# Patient Record
Sex: FEMALE | Race: WHITE | NOT HISPANIC OR LATINO | Employment: OTHER | URBAN - METROPOLITAN AREA
[De-identification: names, ages, dates, MRNs, and addresses within clinical notes are randomized per-mention and may not be internally consistent; named-entity substitution may affect disease eponyms.]

---

## 2017-01-17 ENCOUNTER — GENERIC CONVERSION - ENCOUNTER (OUTPATIENT)
Dept: OTHER | Facility: OTHER | Age: 57
End: 2017-01-17

## 2017-04-20 ENCOUNTER — ALLSCRIPTS OFFICE VISIT (OUTPATIENT)
Dept: OTHER | Facility: OTHER | Age: 57
End: 2017-04-20

## 2017-04-20 DIAGNOSIS — Z12.31 ENCOUNTER FOR SCREENING MAMMOGRAM FOR MALIGNANT NEOPLASM OF BREAST: ICD-10-CM

## 2017-04-20 DIAGNOSIS — Z12.11 ENCOUNTER FOR SCREENING FOR MALIGNANT NEOPLASM OF COLON: ICD-10-CM

## 2017-05-18 ENCOUNTER — GENERIC CONVERSION - ENCOUNTER (OUTPATIENT)
Dept: OTHER | Facility: OTHER | Age: 57
End: 2017-05-18

## 2017-07-05 ENCOUNTER — ALLSCRIPTS OFFICE VISIT (OUTPATIENT)
Dept: OTHER | Facility: OTHER | Age: 57
End: 2017-07-05

## 2017-07-18 ENCOUNTER — ALLSCRIPTS OFFICE VISIT (OUTPATIENT)
Dept: OTHER | Facility: OTHER | Age: 57
End: 2017-07-18

## 2017-07-25 ENCOUNTER — TRANSCRIBE ORDERS (OUTPATIENT)
Dept: ADMINISTRATIVE | Facility: HOSPITAL | Age: 57
End: 2017-07-25

## 2017-07-25 ENCOUNTER — ALLSCRIPTS OFFICE VISIT (OUTPATIENT)
Dept: OTHER | Facility: OTHER | Age: 57
End: 2017-07-25

## 2017-07-25 DIAGNOSIS — R05.9 COUGH: ICD-10-CM

## 2017-07-25 DIAGNOSIS — R05.9 COUGH: Primary | ICD-10-CM

## 2017-07-31 ENCOUNTER — GENERIC CONVERSION - ENCOUNTER (OUTPATIENT)
Dept: OTHER | Facility: OTHER | Age: 57
End: 2017-07-31

## 2017-07-31 ENCOUNTER — HOSPITAL ENCOUNTER (OUTPATIENT)
Dept: PULMONOLOGY | Facility: HOSPITAL | Age: 57
Discharge: HOME | End: 2017-07-31
Payer: COMMERCIAL

## 2017-07-31 DIAGNOSIS — R05.9 COUGH: ICD-10-CM

## 2017-07-31 PROCEDURE — 94060 EVALUATION OF WHEEZING: CPT

## 2017-07-31 PROCEDURE — 94760 N-INVAS EAR/PLS OXIMETRY 1: CPT

## 2017-07-31 RX ORDER — ALBUTEROL SULFATE 2.5 MG/3ML
2.5 SOLUTION RESPIRATORY (INHALATION) ONCE AS NEEDED
Status: COMPLETED | OUTPATIENT
Start: 2017-07-31 | End: 2017-07-31

## 2017-07-31 RX ORDER — ALBUTEROL SULFATE 2.5 MG/3ML
SOLUTION RESPIRATORY (INHALATION)
Status: DISCONTINUED
Start: 2017-07-31 | End: 2017-08-04 | Stop reason: HOSPADM

## 2017-07-31 RX ADMIN — ALBUTEROL SULFATE 2.5 MG: 2.5 SOLUTION RESPIRATORY (INHALATION) at 13:15

## 2017-08-01 ENCOUNTER — TRANSCRIBE ORDERS (OUTPATIENT)
Dept: RADIOLOGY | Facility: CLINIC | Age: 57
End: 2017-08-01

## 2017-08-01 ENCOUNTER — APPOINTMENT (OUTPATIENT)
Dept: RADIOLOGY | Facility: CLINIC | Age: 57
End: 2017-08-01
Payer: COMMERCIAL

## 2017-08-01 ENCOUNTER — ALLSCRIPTS OFFICE VISIT (OUTPATIENT)
Dept: OTHER | Facility: OTHER | Age: 57
End: 2017-08-01

## 2017-08-01 DIAGNOSIS — R05.9 COUGH: ICD-10-CM

## 2017-08-01 PROCEDURE — 71020 HB CHEST X-RAY 2VW FRONTAL&LATL: CPT

## 2017-08-02 ENCOUNTER — GENERIC CONVERSION - ENCOUNTER (OUTPATIENT)
Dept: OTHER | Facility: OTHER | Age: 57
End: 2017-08-02

## 2017-08-23 ENCOUNTER — ALLSCRIPTS OFFICE VISIT (OUTPATIENT)
Dept: OTHER | Facility: OTHER | Age: 57
End: 2017-08-23

## 2018-01-10 ENCOUNTER — GENERIC CONVERSION - ENCOUNTER (OUTPATIENT)
Dept: OTHER | Facility: OTHER | Age: 58
End: 2018-01-10

## 2018-01-10 ENCOUNTER — ALLSCRIPTS OFFICE VISIT (OUTPATIENT)
Dept: OTHER | Facility: OTHER | Age: 58
End: 2018-01-10

## 2018-01-11 NOTE — MISCELLANEOUS
Message   Recorded as Task   Date: 11/02/2016 10:45 AM, Created By: Dianelys Waters   Task Name: Follow Up   Assigned To: Sofi Shin   Regarding Patient: Lizette Unger, Status: In Progress   CommentMaire Draft - 02 Nov 2016 10:45 AM     TASK CREATED  Caller: Self; [phone] (Home)  pt getting bad ha's from venlaxifene also wants a generic patch instead of the minivalle please call her at [address] Nov 2016 11:00 AM     TASK IN PROGRESS   Ketty Mccullough - 11 Nov 2016 11:08 AM     TASK EDITED  Pt started effexor and minivelle at same time  Has had ha"s since  Stopped effexor 2 days ago and still has ha  I think its half life is longer than 2 days? What to do??  I also explained generic minivelle not as convenient a patch  Will delve into this problem once we figure out what ha is from  Thanks   Shaun Edwards - 86 Nov 2016 3:40 PM     TASK REPLIED TO: Previously Assigned To Ricky Connell  Continue Effexor  I agree with minivelle; she will tolerate generic  Restart minivelle 2 weeks   Ketty Mccullough - 89 Nov 2016 9:35 AM     TASK EDITED  lmtcb   Ketty Mccullough - 74 Nov 2016 9:36 AM     TASK EDITED   Ketty Mccullough - 78 Nov 2016 10:41 AM     TASK EDITED  Pt on minivelle but not effexor and still has awful h/a  Pt will start effexor again and stop minivelle for 2 weeks  Will cb in 2 weeks  If ha goes away - probably due to minivelle  Ketty Mccullough - 09 Nov 2016 10:41 AM     TASK COMPLETED   Stephanie Beltre - 05 Dec 2016 10:21 AM     TASK REACTIVATED  Pt called w same complaint of [place] w minivelle  Pt started minivelle last wednesday and by saturday had bad ha again  Pt would like cb to advise what she should do  Pt is @ [address] Dec 2016 10:31 AM     TASK IN PROGRESS   Ketty Mccullough - 05 Dec 2016 10:34 AM     TASK EDITED  Pt does not get ha on effexor but within a day of putting on minivelle, her headaches return  What to do? Thanks   Ricky Connell - 05 Dec 2016 8:01 PM     TASK REPLIED TO: Previously Assigned To Ricky Connell  Have pt cut patch in half to see if benefit over next week  Can reduce dose via RX if that works  BereniceJan - 06 Dec 2016 8:08 AM     TASK EDITED  called pt - per dr Carolyn Jeffery, suggested pt cut patch in half -pt to call office to inform if she has relief with half dose  pt verbalized understanding  Active Problems    1  Acquired hypothyroidism (244 9) (E03 9)   2  Chest pain (786 50) (R07 9)   3  Encounter for gynecological examination with abnormal finding (V72 31) (Z01 411)   4  Encounter for routine gynecological examination (V72 31) (Z01 419)   5  Encounter for screening mammogram for malignant neoplasm of breast (V76 12)   (Z12 31)   6  Hypercholesterolemia (272 0) (E78 00)   7  Hypothyroidism (244 9) (E03 9)   8  Menopause (627 2)   9  Multiple sclerosis (340) (G35)   10  RLS (restless legs syndrome) (333 94) (G25 81)   11  Symptomatic menopausal or female climacteric states (627 2) (N95 1)   12  Tinnitus, subjective (388 31) (H93 19)    Current Meds   1  Aspirin Low Dose 81 MG TABS; Therapy: (Recorded:19Oct2016) to Recorded   2  Hydrocodone-Acetaminophen 7 5-750 MG TABS; Therapy: 32BEP8215 to (Last Rx:02Nov2011)  Requested for: 14VBE8504 Ordered   3  Lidocaine 5 % External Patch; Therapy: (Recorded:19Oct2016) to Recorded   4  Lyrica 75 MG Oral Capsule; Therapy: 87MQB1136 to (Last Glean Dines)  Requested for: 23Nov2011 Ordered   5  Minivelle 0 0375 MG/24HR Transdermal Patch Twice Weekly; APPLY 1 PATCH TWICE   WEEKLY AS DIRECTED; Therapy: 20YVM5660 to (Evaluate:47Gfz9307)  Requested for: 18NCC8434; Last   Rx:19Oct2016 Ordered   6  OxyCONTIN 10 MG Oral Tablet ER 12 Hour Abuse-Deterrent; Therapy: (Recorded:19Oct2016) to Recorded   7  TiZANidine HCl - 2 MG Oral Tablet; TAKE 1 TABLET EVERY 6 HOURS AS NEEDED FOR   SPASM; Therapy: (Recorded:19Oct2016) to Recorded   8  Venlafaxine HCl ER 37 5 MG Oral Capsule Extended Release 24 Hour; take 1 capsule   daily; Therapy: 03AMP5122 to (Evaluate:14Oct2017)  Requested for: 19Oct2016; Last   Rx:19Oct2016 Ordered    Allergies    1   Percocet TABS    Signatures   Electronically signed by : Socorro Escobedo RN; Dec  6 2016  8:09AM EST                       (Author)

## 2018-01-12 VITALS
HEIGHT: 61 IN | TEMPERATURE: 98.3 F | HEART RATE: 106 BPM | OXYGEN SATURATION: 98 % | DIASTOLIC BLOOD PRESSURE: 90 MMHG | SYSTOLIC BLOOD PRESSURE: 138 MMHG | BODY MASS INDEX: 45.31 KG/M2 | WEIGHT: 240 LBS

## 2018-01-12 NOTE — MISCELLANEOUS
Provider Comments  Provider Comments:   Patient was a no show for her appointment today        Signatures   Electronically signed by : Richie Mariano DO; May 19 2017 10:35AM EST

## 2018-01-13 VITALS
SYSTOLIC BLOOD PRESSURE: 150 MMHG | HEIGHT: 61 IN | DIASTOLIC BLOOD PRESSURE: 98 MMHG | HEART RATE: 85 BPM | OXYGEN SATURATION: 98 % | WEIGHT: 243 LBS | BODY MASS INDEX: 45.88 KG/M2 | TEMPERATURE: 98.5 F

## 2018-01-13 VITALS
HEART RATE: 120 BPM | TEMPERATURE: 99.2 F | SYSTOLIC BLOOD PRESSURE: 150 MMHG | DIASTOLIC BLOOD PRESSURE: 98 MMHG | OXYGEN SATURATION: 99 % | BODY MASS INDEX: 45.88 KG/M2 | HEIGHT: 61 IN | WEIGHT: 243 LBS

## 2018-01-13 VITALS
BODY MASS INDEX: 43.43 KG/M2 | HEIGHT: 61 IN | SYSTOLIC BLOOD PRESSURE: 138 MMHG | WEIGHT: 230 LBS | DIASTOLIC BLOOD PRESSURE: 98 MMHG

## 2018-01-14 VITALS
HEART RATE: 102 BPM | WEIGHT: 234.2 LBS | RESPIRATION RATE: 16 BRPM | OXYGEN SATURATION: 98 % | SYSTOLIC BLOOD PRESSURE: 152 MMHG | HEIGHT: 61 IN | BODY MASS INDEX: 44.22 KG/M2 | TEMPERATURE: 98.6 F | DIASTOLIC BLOOD PRESSURE: 90 MMHG

## 2018-01-15 VITALS
BODY MASS INDEX: 46.26 KG/M2 | OXYGEN SATURATION: 98 % | TEMPERATURE: 98.4 F | WEIGHT: 245 LBS | HEIGHT: 61 IN | SYSTOLIC BLOOD PRESSURE: 148 MMHG | DIASTOLIC BLOOD PRESSURE: 90 MMHG | HEART RATE: 88 BPM

## 2018-01-16 NOTE — MISCELLANEOUS
Message   Recorded as Task   Date: 01/10/2017 08:18 AM, Created By: Alberta Ayers   Task Name: Care Coordination   Assigned To: Alberta Ayers   Regarding Patient: Altagracia Beckham, Status: Active   CommentCarlecuauhtemoc Jarquin - 10 Issac 2017 8:18 AM     TASK CREATED  pt came into the office today with her daughter and stopped me to let me know that the patch works really great for her but even if the patch dose is half she still is waking up with a headache everyday  is there something else she can do? Yajaira Lee - 12 Jan 2017 9:50 PM     TASK REPLIED TO: Previously Assigned To Ricky Connell  Have pt stop the patch for one full week  Not sure her headaches are due to the patch  Have her report back after that week  Alberta Ayers - 13 Jan 2017 7:29 AM     TASK REASSIGNED: Previously Assigned To Alberta Ayers  pt stopped the patch 2 weeks ago and has not had a headache since stopping the patch  her hot flashes aren't as bad as what they use to be adn they are tolerable for the winter but the pt is afraid once summer comes the hot flashes will be bad again plus the heat of the summer  any suggestions? Ricky Connell - 16 Jan 2017 4:04 PM     TASK REPLIED TO: Previously Assigned To Ricky Connell  Can increase dose of Effexor if needed as weather gets warmer  Alberta Ayers - 17 Jan 2017 1:59 PM     TASK EDITED  [facility] of what the plan can be for the warmer weather when it comes        Active Problems    1  Acquired hypothyroidism (244 9) (E03 9)   2  Chest pain (786 50) (R07 9)   3  Encounter for gynecological examination with abnormal finding (V72 31) (Z01 411)   4  Encounter for routine gynecological examination (V72 31) (Z01 419)   5  Encounter for screening mammogram for malignant neoplasm of breast (V76 12)   (Z12 31)   6  Hypercholesterolemia (272 0) (E78 00)   7  Hypothyroidism (244 9) (E03 9)   8  Menopause (627 2)   9  Multiple sclerosis (340) (G35)   10   RLS (restless legs syndrome) (333 94) (G25 81)   11  Symptomatic menopausal or female climacteric states (627 2) (N95 1)   12  Tinnitus, subjective (388 31) (H93 19)    Current Meds   1  Aspirin Low Dose 81 MG TABS; Therapy: (Recorded:19Oct2016) to Recorded   2  Hydrocodone-Acetaminophen 7 5-750 MG TABS; Therapy: 11YLN3112 to (Last Rx:02Nov2011)  Requested for: 72UGH4855 Ordered   3  Lidocaine 5 % External Patch; Therapy: (Recorded:19Oct2016) to Recorded   4  Lyrica 75 MG Oral Capsule; Therapy: 58JKC5648 to (Last Zadie Manan)  Requested for: 23Nov2011 Ordered   5  Minivelle 0 0375 MG/24HR Transdermal Patch Twice Weekly; APPLY 1 PATCH TWICE   WEEKLY AS DIRECTED; Therapy: 37MQO6561 to (Evaluate:26Ufx2244)  Requested for: 52JXB1660; Last   Rx:19Oct2016 Ordered   6  OxyCONTIN 10 MG Oral Tablet ER 12 Hour Abuse-Deterrent; Therapy: (Recorded:19Oct2016) to Recorded   7  TiZANidine HCl - 2 MG Oral Tablet; TAKE 1 TABLET EVERY 6 HOURS AS NEEDED FOR   SPASM; Therapy: (Recorded:19Oct2016) to Recorded   8  Venlafaxine HCl ER 37 5 MG Oral Capsule Extended Release 24 Hour; take 1 capsule   daily; Therapy: 42DMV5104 to (Evaluate:22Eaj2742)  Requested for: 19Oct2016; Last   Rx:19Oct2016 Ordered    Allergies    1   Percocet TABS    Signatures   Electronically signed by : Jaison Lamas, ; Jan 17 2017  1:59PM EST                       (Author)

## 2018-01-24 VITALS
HEIGHT: 61 IN | BODY MASS INDEX: 47.01 KG/M2 | HEART RATE: 85 BPM | DIASTOLIC BLOOD PRESSURE: 86 MMHG | WEIGHT: 249 LBS | SYSTOLIC BLOOD PRESSURE: 151 MMHG

## 2018-04-04 RX ORDER — OXYCODONE HYDROCHLORIDE 5 MG/1
TABLET ORAL
COMMUNITY
Start: 2018-01-10 | End: 2018-04-04

## 2018-04-04 RX ORDER — LEVOCETIRIZINE DIHYDROCHLORIDE 5 MG/1
1 TABLET, FILM COATED ORAL
COMMUNITY
Start: 2017-07-21 | End: 2018-04-05 | Stop reason: ALTCHOICE

## 2018-04-04 RX ORDER — ALBUTEROL SULFATE 90 UG/1
AEROSOL, METERED RESPIRATORY (INHALATION)
COMMUNITY
Start: 2017-08-01 | End: 2018-04-05 | Stop reason: HOSPADM

## 2018-04-04 RX ORDER — PRAMIPEXOLE DIHYDROCHLORIDE 0.12 MG/1
1 TABLET ORAL DAILY
COMMUNITY
Start: 2017-04-20 | End: 2018-06-26 | Stop reason: SDUPTHER

## 2018-04-05 ENCOUNTER — OFFICE VISIT (OUTPATIENT)
Dept: INTERNAL MEDICINE CLINIC | Facility: CLINIC | Age: 58
End: 2018-04-05
Payer: COMMERCIAL

## 2018-04-05 VITALS
RESPIRATION RATE: 18 BRPM | WEIGHT: 249 LBS | HEIGHT: 61 IN | BODY MASS INDEX: 47.01 KG/M2 | TEMPERATURE: 98.9 F | HEART RATE: 77 BPM | DIASTOLIC BLOOD PRESSURE: 90 MMHG | OXYGEN SATURATION: 97 % | SYSTOLIC BLOOD PRESSURE: 140 MMHG

## 2018-04-05 DIAGNOSIS — Z13.0 SCREENING FOR DEFICIENCY ANEMIA: ICD-10-CM

## 2018-04-05 DIAGNOSIS — M25.50 ARTHRALGIA, UNSPECIFIED JOINT: ICD-10-CM

## 2018-04-05 DIAGNOSIS — Z13.1 SCREENING FOR DIABETES MELLITUS: ICD-10-CM

## 2018-04-05 DIAGNOSIS — Z12.31 ENCOUNTER FOR MAMMOGRAM TO ESTABLISH BASELINE MAMMOGRAM: ICD-10-CM

## 2018-04-05 DIAGNOSIS — J30.2 CHRONIC SEASONAL ALLERGIC RHINITIS, UNSPECIFIED TRIGGER: ICD-10-CM

## 2018-04-05 DIAGNOSIS — Z13.29 SCREENING FOR THYROID DISORDER: ICD-10-CM

## 2018-04-05 DIAGNOSIS — E55.9 VITAMIN D DEFICIENCY: ICD-10-CM

## 2018-04-05 DIAGNOSIS — Z12.11 COLON CANCER SCREENING: ICD-10-CM

## 2018-04-05 DIAGNOSIS — R05.9 COUGH: Primary | ICD-10-CM

## 2018-04-05 DIAGNOSIS — Z13.220 SCREENING, LIPID: ICD-10-CM

## 2018-04-05 PROBLEM — G25.0 ESSENTIAL TREMOR: Status: ACTIVE | Noted: 2018-01-10

## 2018-04-05 PROCEDURE — 99214 OFFICE O/P EST MOD 30 MIN: CPT | Performed by: PHYSICIAN ASSISTANT

## 2018-04-05 RX ORDER — RANITIDINE 150 MG/1
150 TABLET ORAL 2 TIMES DAILY
Qty: 60 TABLET | Refills: 3 | Status: SHIPPED | OUTPATIENT
Start: 2018-04-05

## 2018-04-05 RX ORDER — MELOXICAM 15 MG/1
15 TABLET ORAL DAILY
Qty: 30 TABLET | Refills: 4 | Status: SHIPPED | OUTPATIENT
Start: 2018-04-05 | End: 2018-05-03 | Stop reason: ALTCHOICE

## 2018-04-05 NOTE — ASSESSMENT & PLAN NOTE
Start mobic prn increased back pain  Pt informed she may want to take this with food to avoid GI upset

## 2018-04-05 NOTE — ASSESSMENT & PLAN NOTE
Chronic non productive cough  PFTS normal and CXR normal  No response to ventolin  We discussed seasonal allergies vs silent gerd as potential causes  Will get alpha 1 test and start zantac as this will help both gerd and allergies

## 2018-04-05 NOTE — PROGRESS NOTES
Assessment/Plan:    Cough  Chronic non productive cough  PFTS normal and CXR normal  No response to ventolin  We discussed seasonal allergies vs silent gerd as potential causes  Will get alpha 1 test and start zantac as this will help both gerd and allergies  Arthralgia  Start mobic prn increased back pain  Pt informed she may want to take this with food to avoid GI upset  Diagnoses and all orders for this visit:    Cough    Arthralgia, unspecified joint  -     meloxicam (MOBIC) 15 mg tablet; Take 1 tablet (15 mg total) by mouth daily    Chronic seasonal allergic rhinitis, unspecified trigger  -     ranitidine (ZANTAC) 150 mg tablet; Take 1 tablet (150 mg total) by mouth 2 (two) times a day    Encounter for mammogram to establish baseline mammogram  -     Mammo screening bilateral w 3d & cad; Future    Screening for diabetes mellitus  -     Comprehensive metabolic panel    Screening, lipid  -     Lipid Panel with Direct LDL reflex    Screening for thyroid disorder  -     TSH baseline    Screening for deficiency anemia  -     CBC and differential    Vitamin D deficiency  -     Vitamin D 25 hydroxy    Colon cancer screening  -     Ambulatory referral to Gastroenterology; Future    Other orders  -     pramipexole (MIRAPEX) 0 125 mg tablet; Take 1 tablet by mouth  -     Discontinue: albuterol (VENTOLIN HFA) 90 mcg/act inhaler; Inhale  -     Discontinue: levocetirizine (XYZAL) 5 MG tablet; Take 1 tablet by mouth  -     Discontinue: oxyCODONE (ROXICODONE) 5 mg immediate release tablet; Earliest Fill Date: 1/10/18   -     Discontinue: aspirin 81 MG tablet; Take 81 mg by mouth          Subjective:      Patient ID: Rafia Bullard is a 56 y o  female  [facility] RSD, RLS, essential tremor, ureteral stenosis, and kidney stones  Past surgeries and allergies are noted in chart  Only medicine is mirapex   She is a former smoker and quit in   She is on disability  She is  with 4 children  She is due for labs, mammogram and colonoscopy  She complains of a chronic cough as outlined in more detail below  She also complains of increased back pain  She states she was previously on opiates long term and got off of them completely in the fall as she did not like how they made her feel  She desires an option that she can use prn  Cough   This is a recurrent problem  The current episode started more than 1 month ago  The problem has been unchanged  The problem occurs every few hours  The cough is non-productive  Associated symptoms include postnasal drip, rhinorrhea and shortness of breath  Pertinent negatives include no chest pain, chills, ear pain, fever, headaches, heartburn, myalgias, rash, sore throat, weight loss or wheezing  The symptoms are aggravated by exercise  She has tried steroid inhaler for the symptoms  The treatment provided no relief  There is no history of asthma, bronchiectasis, bronchitis or COPD  The following portions of the patient's history were reviewed and updated as appropriate:   She  has a past medical history of Reflex sympathetic dystrophy; Restless leg syndrome; RSD lower limb; and Tremor  She   Patient Active Problem List    Diagnosis Date Noted    Chronic seasonal allergic rhinitis 2018    Arthralgia 2018    Essential tremor 01/10/2018    Cough 2017    Hypercholesterolemia 2016    RLS (restless legs syndrome) 2016     She  has a past surgical history that includes Appendectomy (2009);  section; Laparoscopic total hysterectomy; Gastrostomy tube revision; LITHOTRIPSY; Plantar fasciectomy; Tubal ligation; and Ureter surgery (Left)  Her family history includes COPD in her father; Diabetes in her family; Other in her mother; Tremor in her sister  She  reports that she quit smoking about 22 years ago   She has never used smokeless tobacco  She reports that she drinks alcohol  She reports that she does not use drugs  Current Outpatient Prescriptions   Medication Sig Dispense Refill    pramipexole (MIRAPEX) 0 125 mg tablet Take 1 tablet by mouth      meloxicam (MOBIC) 15 mg tablet Take 1 tablet (15 mg total) by mouth daily 30 tablet 4    ranitidine (ZANTAC) 150 mg tablet Take 1 tablet (150 mg total) by mouth 2 (two) times a day 60 tablet 3     No current facility-administered medications for this visit  No current outpatient prescriptions on file prior to visit  No current facility-administered medications on file prior to visit  She is allergic to codeine; oxycodone-acetaminophen; latex; and wound dressings       Review of Systems   Constitutional: Negative for chills, fever and weight loss  HENT: Positive for postnasal drip and rhinorrhea  Negative for congestion, ear pain, hearing loss, sinus pain, sinus pressure, sore throat and trouble swallowing  Eyes: Negative for pain and visual disturbance  Respiratory: Positive for cough and shortness of breath  Negative for chest tightness and wheezing  Cardiovascular: Negative  Negative for chest pain, palpitations and leg swelling  Gastrointestinal: Negative for abdominal pain, blood in stool, constipation, diarrhea, heartburn, nausea and vomiting  Endocrine: Negative for cold intolerance, heat intolerance, polydipsia, polyphagia and polyuria  Genitourinary: Negative for difficulty urinating, dysuria, flank pain and urgency  Musculoskeletal: Positive for back pain  Negative for arthralgias, gait problem and myalgias  Skin: Negative for rash  Allergic/Immunologic: Negative  Neurological: Negative for dizziness, weakness, light-headedness and headaches  Hematological: Negative  Psychiatric/Behavioral: Negative for behavioral problems, dysphoric mood and sleep disturbance  The patient is not nervous/anxious            Objective:      /90 (BP Location: Right arm, Patient Position: Sitting, Cuff Size: Large)   Pulse 77   Temp 98 9 °F (37 2 °C) (Tympanic)   Resp 18   Ht 5' 1 25" (1 556 m)   Wt 113 kg (249 lb)   SpO2 97%   BMI 46 66 kg/m²          Physical Exam   Constitutional: She is oriented to person, place, and time  She appears well-developed and well-nourished  No distress  HENT:   Head: Normocephalic and atraumatic  Right Ear: External ear normal    Left Ear: External ear normal    Nose: Mucosal edema present  Mouth/Throat: Oropharynx is clear and moist  No oropharyngeal exudate  Eyes: Conjunctivae and EOM are normal  Pupils are equal, round, and reactive to light  Right eye exhibits no discharge  Left eye exhibits no discharge  No scleral icterus  Neck: Normal range of motion  Neck supple  No thyromegaly present  Cardiovascular: Normal rate, regular rhythm and normal heart sounds  Exam reveals no gallop and no friction rub  No murmur heard  Pulmonary/Chest: Effort normal and breath sounds normal  No respiratory distress  She has no wheezes  She has no rales  Abdominal: Soft  Bowel sounds are normal  She exhibits no distension  There is no tenderness  Musculoskeletal: Normal range of motion  She exhibits no edema, tenderness or deformity  Neurological: She is alert and oriented to person, place, and time  No cranial nerve deficit  Skin: Skin is warm and dry  She is not diaphoretic  Psychiatric: She has a normal mood and affect   Her behavior is normal  Judgment and thought content normal

## 2018-04-12 ENCOUNTER — APPOINTMENT (OUTPATIENT)
Dept: LAB | Facility: CLINIC | Age: 58
End: 2018-04-12
Payer: COMMERCIAL

## 2018-04-12 ENCOUNTER — TELEPHONE (OUTPATIENT)
Dept: INTERNAL MEDICINE CLINIC | Facility: CLINIC | Age: 58
End: 2018-04-12

## 2018-04-12 ENCOUNTER — TRANSCRIBE ORDERS (OUTPATIENT)
Dept: LAB | Facility: CLINIC | Age: 58
End: 2018-04-12

## 2018-04-12 DIAGNOSIS — M25.50 ARTHRALGIA, UNSPECIFIED JOINT: Primary | ICD-10-CM

## 2018-04-12 LAB
25(OH)D3 SERPL-MCNC: 10.4 NG/ML (ref 30–100)
ALBUMIN SERPL BCP-MCNC: 3.6 G/DL (ref 3.5–5)
ALP SERPL-CCNC: 96 U/L (ref 46–116)
ALT SERPL W P-5'-P-CCNC: 27 U/L (ref 12–78)
ANION GAP SERPL CALCULATED.3IONS-SCNC: 4 MMOL/L (ref 4–13)
AST SERPL W P-5'-P-CCNC: 15 U/L (ref 5–45)
BASOPHILS # BLD AUTO: 0.04 THOUSANDS/ΜL (ref 0–0.1)
BASOPHILS NFR BLD AUTO: 1 % (ref 0–1)
BILIRUB SERPL-MCNC: 0.44 MG/DL (ref 0.2–1)
BUN SERPL-MCNC: 16 MG/DL (ref 5–25)
CALCIUM SERPL-MCNC: 9.4 MG/DL
CHLORIDE SERPL-SCNC: 107 MMOL/L (ref 100–108)
CHOLEST SERPL-MCNC: 205 MG/DL (ref 50–200)
CO2 SERPL-SCNC: 29 MMOL/L (ref 21–32)
CREAT SERPL-MCNC: 0.85 MG/DL (ref 0.6–1.3)
EOSINOPHIL # BLD AUTO: 0.25 THOUSAND/ΜL (ref 0–0.61)
EOSINOPHIL NFR BLD AUTO: 3 % (ref 0–6)
ERYTHROCYTE [DISTWIDTH] IN BLOOD BY AUTOMATED COUNT: 14.2 % (ref 11.6–15.1)
GFR SERPL CREATININE-BSD FRML MDRD: 77 ML/MIN/1.73SQ M
GLUCOSE P FAST SERPL-MCNC: 103 MG/DL (ref 65–99)
HCT VFR BLD AUTO: 43.7 % (ref 34.8–46.1)
HDLC SERPL-MCNC: 54 MG/DL (ref 40–60)
HGB BLD-MCNC: 14.1 G/DL (ref 11.5–15.4)
LDLC SERPL CALC-MCNC: 132 MG/DL (ref 0–100)
LYMPHOCYTES # BLD AUTO: 2.49 THOUSANDS/ΜL (ref 0.6–4.47)
LYMPHOCYTES NFR BLD AUTO: 30 % (ref 14–44)
MCH RBC QN AUTO: 28.3 PG (ref 26.8–34.3)
MCHC RBC AUTO-ENTMCNC: 32.3 G/DL (ref 31.4–37.4)
MCV RBC AUTO: 88 FL (ref 82–98)
MONOCYTES # BLD AUTO: 0.62 THOUSAND/ΜL (ref 0.17–1.22)
MONOCYTES NFR BLD AUTO: 7 % (ref 4–12)
NEUTROPHILS # BLD AUTO: 4.94 THOUSANDS/ΜL (ref 1.85–7.62)
NEUTS SEG NFR BLD AUTO: 59 % (ref 43–75)
NRBC BLD AUTO-RTO: 0 /100 WBCS
PLATELET # BLD AUTO: 274 THOUSANDS/UL (ref 149–390)
PMV BLD AUTO: 10.4 FL (ref 8.9–12.7)
POTASSIUM SERPL-SCNC: 5 MMOL/L (ref 3.5–5.3)
PROT SERPL-MCNC: 7.6 G/DL (ref 6.4–8.2)
RBC # BLD AUTO: 4.99 MILLION/UL (ref 3.81–5.12)
SODIUM SERPL-SCNC: 140 MMOL/L (ref 136–145)
TRIGL SERPL-MCNC: 93 MG/DL
TSH SERPL DL<=0.05 MIU/L-ACNC: 7.63 UIU/ML
WBC # BLD AUTO: 8.35 THOUSAND/UL (ref 4.31–10.16)

## 2018-04-12 PROCEDURE — 82306 VITAMIN D 25 HYDROXY: CPT | Performed by: PHYSICIAN ASSISTANT

## 2018-04-12 PROCEDURE — 80061 LIPID PANEL: CPT | Performed by: PHYSICIAN ASSISTANT

## 2018-04-12 PROCEDURE — 85025 COMPLETE CBC W/AUTO DIFF WBC: CPT | Performed by: PHYSICIAN ASSISTANT

## 2018-04-12 PROCEDURE — 84443 ASSAY THYROID STIM HORMONE: CPT | Performed by: PHYSICIAN ASSISTANT

## 2018-04-12 PROCEDURE — 36415 COLL VENOUS BLD VENIPUNCTURE: CPT | Performed by: PHYSICIAN ASSISTANT

## 2018-04-12 PROCEDURE — 80053 COMPREHEN METABOLIC PANEL: CPT | Performed by: PHYSICIAN ASSISTANT

## 2018-04-12 NOTE — TELEPHONE ENCOUNTER
Pt stopped in office, on mobic, can't take it anymore, feels this med is causing her to have severe headaches, is going to stop it, is there something else you can give her?

## 2018-04-13 DIAGNOSIS — E78.2 MIXED HYPERLIPIDEMIA: Primary | ICD-10-CM

## 2018-04-13 DIAGNOSIS — E55.9 VITAMIN D DEFICIENCY: ICD-10-CM

## 2018-04-13 RX ORDER — ERGOCALCIFEROL 1.25 MG/1
50000 CAPSULE ORAL WEEKLY
Qty: 4 CAPSULE | Refills: 1 | Status: SHIPPED | OUTPATIENT
Start: 2018-04-13

## 2018-04-13 RX ORDER — ATORVASTATIN CALCIUM 10 MG/1
10 TABLET, FILM COATED ORAL DAILY
Qty: 30 TABLET | Refills: 5 | Status: SHIPPED | OUTPATIENT
Start: 2018-04-13 | End: 2018-05-03 | Stop reason: HOSPADM

## 2018-04-16 RX ORDER — DICLOFENAC SODIUM 75 MG/1
75 TABLET, DELAYED RELEASE ORAL 2 TIMES DAILY
Qty: 60 TABLET | Refills: 2 | Status: SHIPPED | OUTPATIENT
Start: 2018-04-16 | End: 2018-05-03

## 2018-05-03 ENCOUNTER — OFFICE VISIT (OUTPATIENT)
Dept: INTERNAL MEDICINE CLINIC | Facility: CLINIC | Age: 58
End: 2018-05-03
Payer: COMMERCIAL

## 2018-05-03 VITALS
OXYGEN SATURATION: 95 % | HEART RATE: 101 BPM | TEMPERATURE: 99.4 F | WEIGHT: 240 LBS | SYSTOLIC BLOOD PRESSURE: 126 MMHG | BODY MASS INDEX: 45.31 KG/M2 | RESPIRATION RATE: 18 BRPM | HEIGHT: 61 IN | DIASTOLIC BLOOD PRESSURE: 88 MMHG

## 2018-05-03 DIAGNOSIS — M25.50 ARTHRALGIA, UNSPECIFIED JOINT: ICD-10-CM

## 2018-05-03 DIAGNOSIS — J30.2 CHRONIC SEASONAL ALLERGIC RHINITIS: ICD-10-CM

## 2018-05-03 DIAGNOSIS — M25.551 RIGHT HIP PAIN: Primary | ICD-10-CM

## 2018-05-03 PROCEDURE — 99214 OFFICE O/P EST MOD 30 MIN: CPT | Performed by: PHYSICIAN ASSISTANT

## 2018-05-03 RX ORDER — TRAMADOL HYDROCHLORIDE 50 MG/1
50 TABLET ORAL EVERY 8 HOURS PRN
Qty: 60 TABLET | Refills: 0 | Status: SHIPPED | OUTPATIENT
Start: 2018-05-03

## 2018-05-03 RX ORDER — NAPROXEN AND ESOMEPRAZOLE MAGNESIUM 500; 20 MG/1; MG/1
1 TABLET, DELAYED RELEASE ORAL 2 TIMES DAILY
Qty: 60 TABLET | Refills: 3 | Status: SHIPPED | OUTPATIENT
Start: 2018-05-03 | End: 2018-06-26

## 2018-05-03 RX ORDER — METHYLPREDNISOLONE 4 MG/1
TABLET ORAL
Qty: 21 TABLET | Refills: 0 | Status: SHIPPED | OUTPATIENT
Start: 2018-05-03 | End: 2018-06-26

## 2018-05-03 NOTE — ASSESSMENT & PLAN NOTE
Will give steroid taper and short supply of tramadol to remedy acute hip pain  Start vimovo for maintenance of back pain

## 2018-05-03 NOTE — PROGRESS NOTES
Assessment/Plan:    Right hip pain  Will give steroid taper and short supply of tramadol to remedy acute hip pain  Start vimovo for maintenance of back pain  Diagnoses and all orders for this visit:    Right hip pain  -     Methylprednisolone 4 MG TBPK; Use as directed on package  -     traMADol (ULTRAM) 50 mg tablet; Take 1 tablet (50 mg total) by mouth every 8 (eight) hours as needed for moderate pain  -     Naproxen-Esomeprazole (VIMOVO) 500-20 MG TBEC; Take 1 tablet by mouth 2 (two) times a day    Chronic seasonal allergic rhinitis    Arthralgia, unspecified joint          Subjective:      Patient ID: Isaias Mandel is a 56 y o  female  Pt presents for follow up  She notes that she stopped taking the lipitor due to side effects and does not want to pursue any options in its place  We reviewed her labs in detail  She notes a slight improvement in her cough with the zantac  She complains of right hip pain that radiates through her right buttock and into her leg  The pain goes all the way down to her foot  Seh denies numbness or tingling or loss of bowel or bladder function  The following portions of the patient's history were reviewed and updated as appropriate:   She  has a past medical history of Reflex sympathetic dystrophy; Restless leg syndrome; RSD lower limb; and Tremor  She   Patient Active Problem List    Diagnosis Date Noted    Right hip pain 2018    Chronic seasonal allergic rhinitis 2018    Arthralgia 2018    Essential tremor 01/10/2018    Cough 2017    Hypercholesterolemia 2016    RLS (restless legs syndrome) 2016     She  has a past surgical history that includes Appendectomy (2009);  section; Laparoscopic total hysterectomy; Gastrostomy tube revision; LITHOTRIPSY; Plantar fasciectomy; Tubal ligation; and Ureter surgery (Left)  Her family history includes COPD in her father; Diabetes in her family;  Other in her mother; Tremor in her sister  She  reports that she quit smoking about 22 years ago  She has never used smokeless tobacco  She reports that she drinks alcohol  She reports that she does not use drugs  Current Outpatient Prescriptions   Medication Sig Dispense Refill    ergocalciferol (VITAMIN D2) 50,000 units Take 1 capsule (50,000 Units total) by mouth once a week 4 capsule 1    pramipexole (MIRAPEX) 0 125 mg tablet Take 1 tablet by mouth      ranitidine (ZANTAC) 150 mg tablet Take 1 tablet (150 mg total) by mouth 2 (two) times a day 60 tablet 3    Methylprednisolone 4 MG TBPK Use as directed on package 21 tablet 0    Naproxen-Esomeprazole (VIMOVO) 500-20 MG TBEC Take 1 tablet by mouth 2 (two) times a day 60 tablet 3    traMADol (ULTRAM) 50 mg tablet Take 1 tablet (50 mg total) by mouth every 8 (eight) hours as needed for moderate pain 60 tablet 0     No current facility-administered medications for this visit  Current Outpatient Prescriptions on File Prior to Visit   Medication Sig    ergocalciferol (VITAMIN D2) 50,000 units Take 1 capsule (50,000 Units total) by mouth once a week    pramipexole (MIRAPEX) 0 125 mg tablet Take 1 tablet by mouth    ranitidine (ZANTAC) 150 mg tablet Take 1 tablet (150 mg total) by mouth 2 (two) times a day    [DISCONTINUED] diclofenac (VOLTAREN) 75 mg EC tablet Take 1 tablet (75 mg total) by mouth 2 (two) times a day    [DISCONTINUED] atorvastatin (LIPITOR) 10 mg tablet Take 1 tablet (10 mg total) by mouth daily    [DISCONTINUED] meloxicam (MOBIC) 15 mg tablet Take 1 tablet (15 mg total) by mouth daily     No current facility-administered medications on file prior to visit  She is allergic to codeine; oxycodone-acetaminophen; latex; and wound dressings       Review of Systems   Constitutional: Negative for chills and fever     HENT: Negative for congestion, ear pain, hearing loss, postnasal drip, rhinorrhea, sinus pain, sinus pressure, sore throat and trouble swallowing  Eyes: Negative for pain and visual disturbance  Respiratory: Negative for cough, chest tightness, shortness of breath and wheezing  Cardiovascular: Negative  Negative for chest pain, palpitations and leg swelling  Gastrointestinal: Negative for abdominal pain, blood in stool, constipation, diarrhea, nausea and vomiting  Endocrine: Negative for cold intolerance, heat intolerance, polydipsia, polyphagia and polyuria  Genitourinary: Negative for difficulty urinating, dysuria, flank pain and urgency  Musculoskeletal: Positive for back pain  Negative for arthralgias, gait problem and myalgias  Skin: Negative for rash  Allergic/Immunologic: Negative  Neurological: Negative for dizziness, weakness, light-headedness and headaches  Hematological: Negative  Psychiatric/Behavioral: Negative for behavioral problems, dysphoric mood and sleep disturbance  The patient is not nervous/anxious  Objective:      /88 (BP Location: Left arm, Patient Position: Sitting, Cuff Size: Adult)   Pulse 101   Temp 99 4 °F (37 4 °C) (Tympanic)   Resp 18   Ht 5' 1 25" (1 556 m)   Wt 109 kg (240 lb)   SpO2 95%   BMI 44 98 kg/m²          Physical Exam   Constitutional: She is oriented to person, place, and time  She appears well-developed and well-nourished  No distress  HENT:   Head: Normocephalic and atraumatic  Right Ear: External ear normal    Left Ear: External ear normal    Nose: Nose normal    Mouth/Throat: Oropharynx is clear and moist  No oropharyngeal exudate  Eyes: Conjunctivae and EOM are normal  Pupils are equal, round, and reactive to light  Right eye exhibits no discharge  Left eye exhibits no discharge  No scleral icterus  Neck: Normal range of motion  Neck supple  No thyromegaly present  Cardiovascular: Normal rate, regular rhythm and normal heart sounds  Exam reveals no gallop and no friction rub  No murmur heard    Pulmonary/Chest: Effort normal and breath sounds normal  No respiratory distress  She has no wheezes  She has no rales  Abdominal: Soft  Bowel sounds are normal  She exhibits no distension  There is no tenderness  Musculoskeletal: Normal range of motion  She exhibits no edema or deformity  Lumbar back: She exhibits tenderness, pain and spasm  Neurological: She is alert and oriented to person, place, and time  No cranial nerve deficit  Skin: Skin is warm and dry  She is not diaphoretic  Psychiatric: She has a normal mood and affect   Her behavior is normal  Judgment and thought content normal

## 2018-05-04 ENCOUNTER — TELEPHONE (OUTPATIENT)
Dept: INTERNAL MEDICINE CLINIC | Facility: CLINIC | Age: 58
End: 2018-05-04

## 2018-05-16 ENCOUNTER — TELEPHONE (OUTPATIENT)
Dept: INTERNAL MEDICINE CLINIC | Facility: CLINIC | Age: 58
End: 2018-05-16

## 2018-05-16 NOTE — TELEPHONE ENCOUNTER
----- Message from Mariano Hernandez PA-C sent at 5/16/2018  9:14 AM EDT -----  Regarding: RE: Criselda Whitfield im not sure if we add GERD to the PA if it will go through since this is a combo med  We can let pt know it was denied and see how she would like to proceed, thanks    ----- Message -----  From: Sam Epperson  Sent: 5/16/2018   8:12 AM  To: Mariano Hernandez PA-C  Subject: RE: Ivory Noman sent a task to the in basket that it needed a pior aut, used right hip pain because that is what it said on her chart  ----- Message -----  From: Mariano Hernandez PA-C  Sent: 5/15/2018   3:26 PM  To: Sam Epperson  Subject: FW: Laurence Crystal                                           ----- Message -----  From: Blaire Valera DO  Sent: 5/15/2018   3:05 PM  To: Mariano Hernandez PA-C  Subject: FW: Dipika Berger never ordered this or have seen this pt recently   ----- Message -----  From: Mariano Hernandez PA-C  Sent: 5/15/2018   2:53 PM  To: Blaire Valera DO  Subject: RE: Laurence Crystal                                       What diagnosis was used?     ----- Message -----  From: Blaire Valera DO  Sent: 5/15/2018   2:48 PM  To: Mariano Hernandez PA-C  Subject: FW: Laurence Crystal                                           ----- Message -----  From: Sam Epperson  Sent: 5/15/2018   8:55 AM  To: Blaire Valera DO  Subject: Laurence Crystal                                           Prior auth denied Vimovo because its not FDA approved for diagnosis

## 2018-06-04 ENCOUNTER — TELEPHONE (OUTPATIENT)
Dept: FAMILY MEDICINE CLINIC | Facility: CLINIC | Age: 58
End: 2018-06-04

## 2018-06-26 ENCOUNTER — OFFICE VISIT (OUTPATIENT)
Dept: INTERNAL MEDICINE CLINIC | Facility: CLINIC | Age: 58
End: 2018-06-26
Payer: COMMERCIAL

## 2018-06-26 VITALS
TEMPERATURE: 98.9 F | HEART RATE: 96 BPM | WEIGHT: 246.8 LBS | RESPIRATION RATE: 18 BRPM | HEIGHT: 61 IN | DIASTOLIC BLOOD PRESSURE: 100 MMHG | BODY MASS INDEX: 46.6 KG/M2 | SYSTOLIC BLOOD PRESSURE: 156 MMHG | OXYGEN SATURATION: 98 %

## 2018-06-26 DIAGNOSIS — H02.409 PTOSIS DUE TO AGING: ICD-10-CM

## 2018-06-26 DIAGNOSIS — R25.1 TREMOR OF HANDS AND FACE: ICD-10-CM

## 2018-06-26 DIAGNOSIS — R05.9 COUGH: ICD-10-CM

## 2018-06-26 DIAGNOSIS — G25.81 RLS (RESTLESS LEGS SYNDROME): ICD-10-CM

## 2018-06-26 DIAGNOSIS — J30.2 CHRONIC SEASONAL ALLERGIC RHINITIS: Primary | ICD-10-CM

## 2018-06-26 PROCEDURE — 99214 OFFICE O/P EST MOD 30 MIN: CPT | Performed by: PHYSICIAN ASSISTANT

## 2018-06-26 RX ORDER — PRAMIPEXOLE DIHYDROCHLORIDE 0.5 MG/1
0.5 TABLET ORAL DAILY
Qty: 30 TABLET | Refills: 3 | Status: SHIPPED | OUTPATIENT
Start: 2018-06-26

## 2018-06-26 NOTE — ASSESSMENT & PLAN NOTE
Will refer to plastics for evaluation  Ptosis may not be severe enough for intervention but will get opinion anyway

## 2018-06-26 NOTE — PROGRESS NOTES
Assessment/Plan:    Cough  Chronic with negative work up thus far  Will refer to allergist per pt request      Tremor of hands and face  Will provide neurology eval to follow up on ongoing tremor  RLS (restless legs syndrome)  Will increase mirapex dose to help remedy symptoms in the meantime    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  Will refer to plastics for evaluation  Ptosis may not be severe enough for intervention but will get opinion anyway  Diagnoses and all orders for this visit:    Chronic seasonal allergic rhinitis  -     Ambulatory Referral to Otolaryngology; Future    Tremor of hands and face  -     Ambulatory referral to Neurology; Future  -     pramipexole (MIRAPEX) 0 5 mg tablet; Take 1 tablet (0 5 mg total) by mouth daily    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  -     Ambulatory referral to Plastic Surgery; Future    RLS (restless legs syndrome)    Cough          Subjective:      Patient ID: Wilma Olszewski is a 66 y o  female  Pt presents for follow up visit  Her right hip pain is much improved since taking the course of tramadol and steroid  Unfortunately her cough remains  It has been ongoing for 8 months  She denies fever, chills or night sweats  The cough is non productive  She had negative CXR and negative alpha 1 test  She believes this is allergy related but has tried mulitple OTC meds without relief  She denies improvement with H2 blocker as well  She would like to see an allergist    She also notes an ongoing tremor  She has RLS and feels that the medicine is not strong enough  She feels she now has RLS symptoms during the day and also has a fine tremor of the hands and head intermittently  She would like to go back to neurology for follow up  She also notes bilateral lag of her upper eyelids  Left worse than the right  At times she feels like it impairs her field of vision and is interested in seeing if she can get this surgically repaired           The following portions of the patient's history were reviewed and updated as appropriate:   She  has a past medical history of Reflex sympathetic dystrophy; Restless leg syndrome; RSD lower limb; and Tremor  She   Patient Active Problem List    Diagnosis Date Noted    Tremor of hands and face 2018    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 2018    Right hip pain 2018    Chronic seasonal allergic rhinitis 2018    Arthralgia 2018    Essential tremor 01/10/2018    Cough 2017    Hypercholesterolemia 2016    RLS (restless legs syndrome) 2016     She  has a past surgical history that includes Appendectomy (2009);  section; Laparoscopic total hysterectomy; Gastrostomy tube revision; LITHOTRIPSY; Plantar fasciectomy; Tubal ligation; and Ureter surgery (Left)  Her family history includes COPD in her father; Diabetes in her family; Other in her mother; Tremor in her sister  She  reports that she quit smoking about 22 years ago  She has never used smokeless tobacco  She reports that she drinks alcohol  She reports that she does not use drugs  Current Outpatient Prescriptions   Medication Sig Dispense Refill    ergocalciferol (VITAMIN D2) 50,000 units Take 1 capsule (50,000 Units total) by mouth once a week 4 capsule 1    pramipexole (MIRAPEX) 0 5 mg tablet Take 1 tablet (0 5 mg total) by mouth daily 30 tablet 3    ranitidine (ZANTAC) 150 mg tablet Take 1 tablet (150 mg total) by mouth 2 (two) times a day 60 tablet 3    traMADol (ULTRAM) 50 mg tablet Take 1 tablet (50 mg total) by mouth every 8 (eight) hours as needed for moderate pain 60 tablet 0     No current facility-administered medications for this visit        Current Outpatient Prescriptions on File Prior to Visit   Medication Sig    ergocalciferol (VITAMIN D2) 50,000 units Take 1 capsule (50,000 Units total) by mouth once a week    ranitidine (ZANTAC) 150 mg tablet Take 1 tablet (150 mg total) by mouth 2 (two) times a day    traMADol (ULTRAM) 50 mg tablet Take 1 tablet (50 mg total) by mouth every 8 (eight) hours as needed for moderate pain    [DISCONTINUED] pramipexole (MIRAPEX) 0 125 mg tablet Take 1 tablet by mouth daily      [DISCONTINUED] Methylprednisolone 4 MG TBPK Use as directed on package    [DISCONTINUED] Naproxen-Esomeprazole (VIMOVO) 500-20 MG TBEC Take 1 tablet by mouth 2 (two) times a day     No current facility-administered medications on file prior to visit  She is allergic to codeine; other; oxycodone-acetaminophen; latex; and wound dressings       Review of Systems   Constitutional: Negative for chills and fever  HENT: Negative for congestion, ear pain, hearing loss, postnasal drip, rhinorrhea, sinus pain, sinus pressure, sore throat and trouble swallowing  Eyes: Positive for visual disturbance  Negative for pain  Respiratory: Positive for cough  Negative for chest tightness, shortness of breath and wheezing  Cardiovascular: Negative  Negative for chest pain, palpitations and leg swelling  Gastrointestinal: Negative for abdominal pain, blood in stool, constipation, diarrhea, nausea and vomiting  Endocrine: Negative for cold intolerance, heat intolerance, polydipsia, polyphagia and polyuria  Genitourinary: Negative for difficulty urinating, dysuria, flank pain and urgency  Musculoskeletal: Negative for arthralgias, back pain, gait problem and myalgias  Skin: Negative for rash  Allergic/Immunologic: Negative  Neurological: Positive for tremors  Negative for dizziness, weakness, light-headedness and headaches  Hematological: Negative  Psychiatric/Behavioral: Negative for behavioral problems, dysphoric mood and sleep disturbance  The patient is not nervous/anxious            Objective:      /100 (BP Location: Right arm, Patient Position: Sitting, Cuff Size: Extra-Large)   Pulse 96   Temp 98 9 °F (37 2 °C) (Tympanic)   Resp 18   Ht 5' 1 25" (1 556 m)   Wt 112 kg (246 lb 12 8 oz)   SpO2 98%   BMI 46 25 kg/m² Physical Exam   Constitutional: She is oriented to person, place, and time  She appears well-developed and well-nourished  No distress  HENT:   Head: Normocephalic and atraumatic  Right Ear: External ear normal    Left Ear: External ear normal    Nose: Nose normal    Mouth/Throat: Oropharynx is clear and moist  No oropharyngeal exudate  Eyes: Conjunctivae and EOM are normal  Pupils are equal, round, and reactive to light  Right eye exhibits no discharge  Left eye exhibits no discharge  No scleral icterus  Neck: Normal range of motion  Neck supple  No thyromegaly present  Cardiovascular: Normal rate, regular rhythm and normal heart sounds  Exam reveals no gallop and no friction rub  No murmur heard  Pulmonary/Chest: Effort normal and breath sounds normal  No respiratory distress  She has no wheezes  She has no rales  Abdominal: Soft  Bowel sounds are normal  She exhibits no distension  There is no tenderness  Musculoskeletal: Normal range of motion  She exhibits no edema, tenderness or deformity  Neurological: She is alert and oriented to person, place, and time  She displays tremor  No cranial nerve deficit  Skin: Skin is warm and dry  She is not diaphoretic  Psychiatric: She has a normal mood and affect   Her behavior is normal  Judgment and thought content normal

## 2018-06-27 ENCOUNTER — TELEPHONE (OUTPATIENT)
Dept: NEUROLOGY | Facility: CLINIC | Age: 58
End: 2018-06-27

## 2018-06-27 DIAGNOSIS — G25.81 RLS (RESTLESS LEGS SYNDROME): Primary | ICD-10-CM

## 2018-06-27 RX ORDER — PRAMIPEXOLE DIHYDROCHLORIDE 0.12 MG/1
TABLET ORAL
Qty: 90 TABLET | Refills: 1 | Status: SHIPPED | OUTPATIENT
Start: 2018-06-27